# Patient Record
Sex: MALE | Race: WHITE | NOT HISPANIC OR LATINO | Employment: OTHER | URBAN - METROPOLITAN AREA
[De-identification: names, ages, dates, MRNs, and addresses within clinical notes are randomized per-mention and may not be internally consistent; named-entity substitution may affect disease eponyms.]

---

## 2024-08-02 ENCOUNTER — OFFICE VISIT (OUTPATIENT)
Dept: URGENT CARE | Facility: CLINIC | Age: 44
End: 2024-08-02

## 2024-08-02 ENCOUNTER — HOSPITAL ENCOUNTER (OUTPATIENT)
Dept: RADIOLOGY | Facility: CLINIC | Age: 44
Discharge: HOME | End: 2024-08-02

## 2024-08-02 VITALS
DIASTOLIC BLOOD PRESSURE: 92 MMHG | SYSTOLIC BLOOD PRESSURE: 146 MMHG | OXYGEN SATURATION: 99 % | RESPIRATION RATE: 14 BRPM | TEMPERATURE: 98.4 F | HEART RATE: 72 BPM

## 2024-08-02 DIAGNOSIS — S93.401A INVERSION SPRAIN OF ANKLE, RIGHT, INITIAL ENCOUNTER: ICD-10-CM

## 2024-08-02 DIAGNOSIS — G89.29 CHRONIC PAIN OF RIGHT ANKLE: ICD-10-CM

## 2024-08-02 DIAGNOSIS — M25.571 CHRONIC PAIN OF RIGHT ANKLE: ICD-10-CM

## 2024-08-02 DIAGNOSIS — G89.29 CHRONIC PAIN OF RIGHT ANKLE: Primary | ICD-10-CM

## 2024-08-02 DIAGNOSIS — M25.571 CHRONIC PAIN OF RIGHT ANKLE: Primary | ICD-10-CM

## 2024-08-02 PROCEDURE — 73610 X-RAY EXAM OF ANKLE: CPT | Mod: RIGHT SIDE | Performed by: RADIOLOGY

## 2024-08-02 PROCEDURE — 99212 OFFICE O/P EST SF 10 MIN: CPT | Performed by: PHYSICIAN ASSISTANT

## 2024-08-02 PROCEDURE — 73610 X-RAY EXAM OF ANKLE: CPT | Mod: RT

## 2024-08-02 RX ORDER — PREDNISONE 50 MG/1
50 TABLET ORAL DAILY
Qty: 10 TABLET | Refills: 0 | Status: SHIPPED | OUTPATIENT
Start: 2024-08-02 | End: 2024-08-12

## 2024-08-02 NOTE — PROGRESS NOTES
"[facility] URGENT CARE   MIKHAIL NOTE:      Name: Renny You, 44 y.o.    CSN:[id number]   MRN:[id number]    PCP: No Assigned PCP Generic Provider, MD    ALL:  No Known Allergies    History:    Chief Complaint: R ankle pain (X 2 months, no known injury)    Encounter Date: 8/2/2024  ***    HPI: The history was obtained from the {[facility] HISTORIAN:48268::\"patient\"}. Renny is a 44 y.o. male, who presents with a chief complaint of R ankle pain (X 2 months, no known injury) ***    PMHx:    History reviewed. No pertinent past medical history.           No current outpatient medications on file.     No current facility-administered medications for this visit.         PMSx:  History reviewed. No pertinent surgical history.    Fam Hx: No family history on file.    SOC. Hx:     Social History     Socioeconomic History   • Marital status: Single     Spouse name: Not on file   • Number of children: Not on file   • Years of education: Not on file   • Highest education level: Not on file   Occupational History   • Not on file   Tobacco Use   • Smoking status: Never   • Smokeless tobacco: Never   Substance and Sexual Activity   • Alcohol use: Not on file   • Drug use: Not on file   • Sexual activity: Not on file   Other Topics Concern   • Not on file   Social History Narrative   • Not on file     Social Determinants of Health     Financial Resource Strain: Not on file   Food Insecurity: Not on file   Transportation Needs: Not on file   Physical Activity: Not on file   Stress: Not on file   Social Connections: Not on file   Intimate Partner Violence: Not on file   Housing Stability: Not on file         Vitals:    08/02/24 1430   BP: (!) 146/92   Pulse: 72   Resp: 14   Temp: 36.9 °C (98.4 °F)   SpO2: 99%                Physical Exam  ***    LABORATORY @ RADIOLOGICAL IMAGING (if done):     No results found for this or any previous visit (from the past 24 " hour(s)).    ____________________________________________________________________    I did personally review Renny's past medical history, surgical history, social history, as well as family history (when relevant).  In this case, I also oversaw the his drug management by reviewing his medication list, allergy list, as well as the medications that I prescribed during the UC course and/or recommended as an out-patient (including possible OTC medications such as acetaminophen, NSAIDs , etc).    After reviewing the items above, I {DID/DID NOT:42275} look at previous medical documentation, such as recent hospitalizations, office visits, and/or recent consultations with PCP/specialist.                          SDOH:   Another factor that I considered in Renny's care was his Social Determinants of Health (SDOH). During this UC encounter, he {DID/DID NOT:53423} have social determinants of health. Those SDOH influencing Renny's care are: {pkvsdoh:80021}      _____________________________________________________________________      UC COURSE/MEDICAL DECISION MAKING:    Renny is a 44 y.o., who presents with a working diagnosis of No diagnosis found. with a differential to include:         Rupert Stout PA-C   Advanced Practice Provider  [facility] URGENT CARE

## 2024-08-02 NOTE — PROGRESS NOTES
[facility] URGENT CARE MIKHAIL NOTE:      Name: Renny You, 44 y.o.    CSN:[id number]   MRN:[id number]    PCP: No Assigned PCP Generic Provider, MD    ALL:  No Known Allergies    History:    Chief Complaint: R ankle pain (X 2 months, no known injury)    Encounter Date: 8/2/2024  14:41    HPI: The history was obtained from the patient. Renny is a 44 y.o. male, who presents with a chief complaint of R ankle pain (X 2 months, no known injury) Patient felt a throbbing pain in his right ankle last night. He states that the pain radiates from around his little toe towards his lower calf. The pain is elicited when he pivots his right foot to the left and during dorsiflexion. He notes that he may have initially had the injury about a month and a half ago when jumping out of his truck. Patient is a  and spends a lot of time driving. He has tried wearing a compression sock since last night and cold compresses with minimal relief. Patient is able to bear weight on his right foot.     PMHx:    History reviewed. No pertinent past medical history.           Current Outpatient Medications   Medication Sig Dispense Refill    predniSONE (Deltasone) 50 mg tablet Take 1 tablet (50 mg) by mouth once daily for 10 days. 10 tablet 0     No current facility-administered medications for this visit.         PMSx:  History reviewed. No pertinent surgical history.    Fam Hx: No family history on file.    SOC. Hx:     Social History     Socioeconomic History    Marital status: Single     Spouse name: Not on file    Number of children: Not on file    Years of education: Not on file    Highest education level: Not on file   Occupational History    Not on file   Tobacco Use    Smoking status: Never    Smokeless tobacco: Never   Substance and Sexual Activity    Alcohol use: Not on file    Drug use: Not on file    Sexual activity: Not on file   Other Topics Concern    Not on file   Social History Narrative    Not  on file     Social Determinants of Health     Financial Resource Strain: Not on file   Food Insecurity: Not on file   Transportation Needs: Not on file   Physical Activity: Not on file   Stress: Not on file   Social Connections: Not on file   Intimate Partner Violence: Not on file   Housing Stability: Not on file         Vitals:    08/02/24 1430   BP: (!) 146/92   Pulse: 72   Resp: 14   Temp: 36.9 °C (98.4 °F)   SpO2: 99%                Physical Exam  Vitals reviewed.   Constitutional:       Appearance: Normal appearance.   HENT:      Head: Normocephalic and atraumatic.      Mouth/Throat:      Mouth: Mucous membranes are moist.      Pharynx: Oropharynx is clear.   Cardiovascular:      Rate and Rhythm: Normal rate and regular rhythm.   Pulmonary:      Effort: Pulmonary effort is normal.      Breath sounds: Normal breath sounds.   Musculoskeletal:         General: Normal range of motion.      Cervical back: Normal range of motion.        Feet:    Feet:      Comments: Tenderness elicited with dorsiflexion and eversion of right foot. No ecchymoses, erythema, or edema.  Skin:     General: Skin is warm.   Neurological:      General: No focal deficit present.      Mental Status: He is alert and oriented to person, place, and time.   Psychiatric:         Mood and Affect: Mood normal.           LABORATORY @ RADIOLOGICAL IMAGING (if done):     Three views of right ankle radiographic imaging was done. No evidence of fracture or osteophytes present.  ____________________________________________________________________    I did personally review Renny's past medical history, surgical history, social history, as well as family history (when relevant).  In this case, I also oversaw the his drug management by reviewing his medication list, allergy list, as well as the medications that I prescribed during the UC course and/or recommended as an out-patient (including possible OTC medications such as acetaminophen, NSAIDs ,  etc).    After reviewing the items above, I did look at previous medical documentation, such as recent hospitalizations, office visits, and/or recent consultations with PCP/specialist.                          SDOH:   Another factor that I considered in Renny's care was his Social Determinants of Health (SDOH). During this  encounter, he did not have social determinants of health. Those SDOH influencing Renny's care are: none       COURSE/MEDICAL DECISION MAKING:    Renny is a 44 y.o., who presents with a working diagnosis of   1. Chronic pain of right ankle    2. Inversion sprain of ankle, right, initial encounter     with a differential to include:     Lateral ankle sprain  Fracture  DVT    Patient is still able to bear weight on right ankle. Patient has had ankle pain for about two months without resolution. Patient works as a  and drives for long periods of time, which may put him at risk for emboli traveling to his legs and be a cause of his symptoms.       Plan: Through shared decision making, discussed options with patient, including seeking physical therapy, ankle splinting, and obtaining radiographic imaging. Discussed that patient may be at risk for blood clots due to his work and may consider prophylactic aspirin in the future.     Reviewed ankle x-rays with patient and reassured patient that no fractures were found on imaging. Fitted patient with figure of ankle splint and educated him on proper usage.     Provided patient with compression stockings to wear while he is driving. Patient to start taking 10-day course of prednisone to reduce inflammation and counseled patient on ankle stretching exercises.     IAmber PA student, helped prepare the medical record for my supervising clinician, Rupert Stout PA-C.   STEFANO Power, [facility]    Supervised by  Rupert Stout PA-C   Advanced Practice Provider  [facility]  URGENT CARE    I was present with the PA student who participated in the documentation of this note. I have personally seen and re-examined the patient and performed the medical decision-making components (assessment and plan of care). I have reviewed the PA student documentation and verified the findings in the note as written with additions or exceptions as stated in the body of this note.    Rupert Stout PA-C

## 2025-07-12 ENCOUNTER — OFFICE VISIT (OUTPATIENT)
Dept: URGENT CARE | Facility: CLINIC | Age: 45
End: 2025-07-12

## 2025-07-12 VITALS
SYSTOLIC BLOOD PRESSURE: 147 MMHG | TEMPERATURE: 98 F | HEIGHT: 73 IN | WEIGHT: 210 LBS | RESPIRATION RATE: 16 BRPM | DIASTOLIC BLOOD PRESSURE: 93 MMHG | HEART RATE: 79 BPM | OXYGEN SATURATION: 97 % | BODY MASS INDEX: 27.83 KG/M2

## 2025-07-12 DIAGNOSIS — R63.1 POLYDIPSIA: Primary | ICD-10-CM

## 2025-07-12 DIAGNOSIS — R35.89 POLYURIA: ICD-10-CM

## 2025-07-12 DIAGNOSIS — R13.13 PHARYNGEAL DYSPHAGIA: ICD-10-CM

## 2025-07-12 DIAGNOSIS — R03.0 ELEVATED BLOOD PRESSURE READING: ICD-10-CM

## 2025-07-12 DIAGNOSIS — R81 GLUCOSURIA: ICD-10-CM

## 2025-07-12 LAB
POC APPEARANCE, URINE: CLEAR
POC BILIRUBIN, URINE: NEGATIVE
POC BLOOD, URINE: NEGATIVE
POC COLOR, URINE: YELLOW
POC GLUCOSE, URINE: ABNORMAL MG/DL
POC KETONES, URINE: NEGATIVE MG/DL
POC LEUKOCYTES, URINE: NEGATIVE
POC NITRITE,URINE: NEGATIVE
POC PH, URINE: 6 PH
POC PROTEIN, URINE: NEGATIVE MG/DL
POC SPECIFIC GRAVITY, URINE: 1.02
POC UROBILINOGEN, URINE: 0.2 EU/DL

## 2025-07-12 PROCEDURE — 81002 URINALYSIS NONAUTO W/O SCOPE: CPT | Performed by: PHYSICIAN ASSISTANT

## 2025-07-12 PROCEDURE — 99213 OFFICE O/P EST LOW 20 MIN: CPT | Performed by: PHYSICIAN ASSISTANT

## 2025-07-12 ASSESSMENT — VISUAL ACUITY: OU: 1

## 2025-07-12 NOTE — PATIENT INSTRUCTIONS
Suggest follow up with:  MD Tricia Henson Continuous Glucose monitor = [email or web address]  Tereso Continuous glucose monitor = [email or web address]    AI to help inform you of labs results & treatment suggestions = Isi Salazar Ada, Healthily

## 2025-07-12 NOTE — PROGRESS NOTES
[facility] URGENT CARE   MIKHAIL NOTE:      Name: Renny You, 45 y.o.    CSN:[id number]   MRN:[id number]    PCP: No Assigned PCP Generic Provider, MD    ALL:  Allergies[1]    History:    Chief Complaint: Dizziness (Pt states vertigo issues for the last few years as well as trouble swallowing, pt also states feet and leg pain for the last year with no injury.)    Encounter Date: 7/12/2025  10:23    At the beginning of the encounter, I introduced myself to the patient as a Physician Assistant in the urgent care setting, ensuring they understood my role in their care and establishing rapport.      HPI: The history was obtained from the patient. Renny is a 45 y.o. male, who presents with a chief complaint of Dizziness (Pt states vertigo issues for the last few years as well as trouble swallowing, pt also states feet and leg pain for the last year with no injury.)     Any of the symptoms have been chronic, more recent he has been experiencing progressive a sending pain in both legs noticed at night and at rest, he finds that this is also an experience of fatigue in both legs like he walked miles even though he was sitting for a while.    He is also experienced some occasional blurred vision, an increase in thirst and urination, he attributes this polydipsia and polyuria to increase hydration and water intake.  He says while at home he drinks at least 6, 500 mL bottles of water and while on the road he drinks at least 4.    He says for many years he had been drinking quite a bit and smoking quite a bit, he ultimately discontinued drinking smoking over a year and a half ago.  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    He does not have a family physician and plans to establish with 1, he is limited by lack of insurance  coverage.    .  Denies any chest pain, denies any dyspnea, denies any massive weight loss but has tried to lose weight and has maybe lost 15 pounds in the course of 6 months.            PMHx:    Denies taking anything        Current Medications[2]      PMSx:  Surgical History[3]    Fam Hx:   Grandfather  at 60s from coronary disease  Grandmother and mother all have a history of vertigo    SOC. Hx:     Social History     Socioeconomic History    Marital status: Single     Spouse name: Not on file    Number of children: Not on file    Years of education: Not on file    Highest education level: Not on file   Occupational History    Not on file   Tobacco Use    Smoking status: Former     Types: Cigarettes    Smokeless tobacco: Never   Substance and Sexual Activity    Alcohol use: Not Currently     Comment: Quit 1-1/2 years ago, had a heavy drinking history about 6-8 beers per night    Drug use: Yes     Types: Marijuana    Sexual activity: Not Currently   Other Topics Concern    Not on file   Social History Narrative    Not on file     Social Drivers of Health     Financial Resource Strain: Not on file   Food Insecurity: Not on file   Transportation Needs: Not on file   Physical Activity: Not on file   Stress: Not on file   Social Connections: Not on file   Intimate Partner Violence: Not on file   Housing Stability: Not on file         Vitals:    25 1019   BP: (!) 147/93   Pulse: 79   Resp: 16   Temp: 36.7 °C (98 °F)   SpO2: 97%     95.3 kg (210 lb)          Physical Exam  Vitals reviewed.   Constitutional:       Appearance: Normal appearance. He is obese.   HENT:      Head: Normocephalic and atraumatic.      Nose: Nose normal.      Mouth/Throat:      Mouth: Mucous membranes are moist.   Eyes:      General: Lids are normal. Vision grossly intact. No visual field deficit.     Extraocular Movements: Extraocular movements intact.      Conjunctiva/sclera:      Right eye: No exudate.     Left eye: No exudate.      Pupils: Pupils are equal, round, and reactive to light.      Right eye: Pupil is round and reactive.      Left eye: Pupil is round and reactive.      Funduscopic exam:     Right eye: No hemorrhage, exudate or papilledema. Red reflex present.         Left eye: No hemorrhage, exudate or papilledema. Red reflex present.     Slit lamp exam:     Right eye: Anterior chamber quiet.      Left eye: Anterior chamber quiet.      Comments: Pupils are 4 mm equal and reactive to light   Neck:      Thyroid: No thyromegaly or thyroid tenderness.   Cardiovascular:      Rate and Rhythm: Normal rate and regular rhythm.      Pulses: No decreased pulses.      Heart sounds: Normal heart sounds. No murmur heard.  Pulmonary:      Effort: Pulmonary effort is normal.      Breath sounds: Normal breath sounds. No decreased breath sounds, wheezing or rhonchi.   Abdominal:      General: Abdomen is flat.      Palpations: Abdomen is soft.   Musculoskeletal:         General: Normal range of motion.      Cervical back: Normal, full passive range of motion without pain, normal range of motion and neck supple.      Thoracic back: Normal.      Lumbar back: Normal.      Right lower leg: No edema.      Left lower leg: No edema.      Comments: Strength appears to be 5+ although upper and lower extremities   Lymphadenopathy:      Cervical: No cervical adenopathy.   Skin:     General: Skin is warm.      Capillary Refill: Capillary refill takes less than 2 seconds.   Neurological:      Mental Status: He is alert and oriented to person, place, and time.      GCS: GCS eye subscore is 4. GCS verbal subscore is 5. GCS motor subscore is 6.      Cranial Nerves: No facial asymmetry.      Sensory: Sensation is intact. No sensory deficit.      Motor: Motor function is intact. No weakness or tremor.      Coordination: Coordination is intact. Coordination normal. Finger-Nose-Finger Test normal.      Gait: Gait and tandem walk normal.      Deep Tendon Reflexes:       Reflex Scores:       Tricep reflexes are 1+ on the right side and 1+ on the left side.       Bicep reflexes are 1+ on the right side and 1+ on the left side.       Brachioradialis reflexes are 1+ on the right side and 1+ on the left side.       Patellar reflexes are 1+ on the right side and 1+ on the left side.       Achilles reflexes are 1+ on the right side and 1+ on the left side.  Psychiatric:         Behavior: Behavior normal.           LABORATORY @ RADIOLOGICAL IMAGING (if done):     Results for orders placed or performed in visit on 07/12/25 (from the past 24 hours)   POCT UA (nonautomated w/o microscopy) manually resulted   Result Value Ref Range    POC Color, Urine Yellow Straw, Yellow, Light-Yellow    POC Appearance, Urine Clear Clear    POC Glucose, Urine 100 (1+) (A) NEGATIVE mg/dl    POC Bilirubin, Urine NEGATIVE NEGATIVE    POC Ketones, Urine NEGATIVE NEGATIVE mg/dl    POC Specific Gravity, Urine 1.020 1.005 - 1.035    POC Blood, Urine NEGATIVE NEGATIVE    POC PH, Urine 6.0 No Reference Range Established PH    POC Protein, Urine NEGATIVE NEGATIVE mg/dl    POC Urobilinogen, Urine 0.2 0.2, 1.0 EU/DL    Poc Nitrite, Urine NEGATIVE NEGATIVE    POC Leukocytes, Urine NEGATIVE NEGATIVE       ____________________________________________________________________    I did personally review Renny's past medical history, surgical history, social history, as well as family history (when relevant).  In this case, I also oversaw the his drug management by reviewing his medication list, allergy list, as well as the medications that I prescribed during the UC course and/or recommended as an out-patient (including possible OTC medications such as acetaminophen, NSAIDs , etc).    After reviewing the items above, I did look at previous medical documentation, such as recent hospitalizations, office visits, and/or recent consultations with PCP/specialist.                          SDOH:   Another factor that I considered in  Renny's care was his Social Determinants of Health (SDOH). During this  encounter, he did not have social determinants of health. Those SDOH influencing Renny's care are: none      _____________________________________________________________________       COURSE/MEDICAL DECISION MAKING:    Renny is a 45 y.o., who presents with a working diagnosis of   1. Polydipsia    2. Polyuria    3. Pharyngeal dysphagia    4. Glucosuria    5. Elevated blood pressure reading      1.  Patient has evidence of glucosuria this is performed and observed on the point-of-care testing obtained in office today, I am concerned that this could be a clue to an underlying insulin resistance or diabetes and therefore we pursued testing along with the physical exam findings and HPI.     - We had a long discussion regarding overall testing and monitoring and will notify of the results of the test have been obtained here, he intends to purchase a third-party CGM, we discussed diet and low glycemic index foods being favorable, encouraged exercise at least in a aerobic form, discussed scheduling a follow-up appointment with Dr. Gupta, as he performs EGDs as well given his pharyngeal dysphagia with most solids would be beneficial to rule out a Schatzki's ring or some stricture.     - I am assuming most of these complaints will be resolved once his blood sugar is under control, I may prescribe metformin to bridge him until his appointment is scheduled, we discussed when to seek reevaluation or higher level of care, he was agreeable this plan seem to be motivated and favorable of treatment and was discharged.  - I have ordered labs to address the concern for diabetes (T2DM suspected) and to address any underlying renal pathology with UA tests.        Rupert Stout PA-C   Advanced Practice Provider  [facility] URGENT CARE    Please note: While the patient may or may not have received printed discharge paperwork,  all relevant medical findings, test results, and treatment details are accessible through the electronic medical record system. The patient is encouraged to review their chart via the patient portal for comprehensive information and follow-up instructions.         [1] No Known Allergies  [2]   No current outpatient medications on file.     No current facility-administered medications for this visit.   [3] No past surgical history on file.

## 2025-07-13 ENCOUNTER — TELEPHONE (OUTPATIENT)
Dept: URGENT CARE | Facility: CLINIC | Age: 45
End: 2025-07-13

## 2025-07-13 DIAGNOSIS — G62.9 NEUROPATHY: ICD-10-CM

## 2025-07-13 DIAGNOSIS — E11.69 TYPE 2 DIABETES MELLITUS WITH OTHER SPECIFIED COMPLICATION, WITHOUT LONG-TERM CURRENT USE OF INSULIN: Primary | ICD-10-CM

## 2025-07-13 DIAGNOSIS — R13.13 PHARYNGEAL DYSPHAGIA: ICD-10-CM

## 2025-07-13 LAB
ALBUMIN/CREAT UR: NORMAL
C PEPTIDE SERPL-MCNC: 5.91 NG/ML (ref 0.8–3.85)
CREAT UR-MCNC: NORMAL MG/DL
MICROALBUMIN UR-MCNC: NORMAL

## 2025-07-13 RX ORDER — METFORMIN HYDROCHLORIDE 500 MG/1
500 TABLET ORAL
Qty: 60 TABLET | Refills: 0 | Status: SHIPPED | OUTPATIENT
Start: 2025-07-13 | End: 2026-07-13

## 2025-07-14 LAB
ALBUMIN SERPL-MCNC: 4.5 G/DL (ref 3.6–5.1)
ALBUMIN/CREAT UR: 3 MG/G CREAT
ALP SERPL-CCNC: 52 U/L (ref 36–130)
ALT SERPL-CCNC: 13 U/L (ref 9–46)
ANION GAP SERPL CALCULATED.4IONS-SCNC: 9 MMOL/L (CALC) (ref 7–17)
AST SERPL-CCNC: 18 U/L (ref 10–40)
BASOPHILS # BLD AUTO: 58 CELLS/UL (ref 0–200)
BASOPHILS NFR BLD AUTO: 1.1 %
BILIRUB SERPL-MCNC: 0.9 MG/DL (ref 0.2–1.2)
BUN SERPL-MCNC: 12 MG/DL (ref 7–25)
C PEPTIDE SERPL-MCNC: 5.91 NG/ML (ref 0.8–3.85)
CALCIUM SERPL-MCNC: 9.3 MG/DL (ref 8.6–10.3)
CHLORIDE SERPL-SCNC: 105 MMOL/L (ref 98–110)
CO2 SERPL-SCNC: 25 MMOL/L (ref 20–32)
CREAT SERPL-MCNC: 0.9 MG/DL (ref 0.6–1.29)
CREAT UR-MCNC: 165 MG/DL (ref 20–320)
EGFRCR SERPLBLD CKD-EPI 2021: 106 ML/MIN/1.73M2
EOSINOPHIL # BLD AUTO: 69 CELLS/UL (ref 15–500)
EOSINOPHIL NFR BLD AUTO: 1.3 %
ERYTHROCYTE [DISTWIDTH] IN BLOOD BY AUTOMATED COUNT: 13.1 % (ref 11–15)
EST. AVERAGE GLUCOSE BLD GHB EST-MCNC: 117 MG/DL
EST. AVERAGE GLUCOSE BLD GHB EST-SCNC: 6.5 MMOL/L
FERRITIN SERPL-MCNC: 96 NG/ML (ref 38–380)
GLUCOSE SERPL-MCNC: 123 MG/DL (ref 65–99)
HBA1C MFR BLD: 5.7 %
HCT VFR BLD AUTO: 48.5 % (ref 38.5–50)
HGB BLD-MCNC: 15.8 G/DL (ref 13.2–17.1)
IRON SATN MFR SERPL: 24 % (CALC) (ref 20–48)
IRON SERPL-MCNC: 86 MCG/DL (ref 50–180)
LYMPHOCYTES # BLD AUTO: 1701 CELLS/UL (ref 850–3900)
LYMPHOCYTES NFR BLD AUTO: 32.1 %
MCH RBC QN AUTO: 30.3 PG (ref 27–33)
MCHC RBC AUTO-ENTMCNC: 32.6 G/DL (ref 32–36)
MCV RBC AUTO: 93.1 FL (ref 80–100)
MICROALBUMIN UR-MCNC: 0.5 MG/DL
MONOCYTES # BLD AUTO: 413 CELLS/UL (ref 200–950)
MONOCYTES NFR BLD AUTO: 7.8 %
NEUTROPHILS # BLD AUTO: 3058 CELLS/UL (ref 1500–7800)
NEUTROPHILS NFR BLD AUTO: 57.7 %
PLATELET # BLD AUTO: 227 THOUSAND/UL (ref 140–400)
PMV BLD REES-ECKER: 10.3 FL (ref 7.5–12.5)
POTASSIUM SERPL-SCNC: 4.2 MMOL/L (ref 3.5–5.3)
PROT SERPL-MCNC: 7 G/DL (ref 6.1–8.1)
QUEST TRACKING HOUSE ACCOUNT: NORMAL
RBC # BLD AUTO: 5.21 MILLION/UL (ref 4.2–5.8)
SODIUM SERPL-SCNC: 139 MMOL/L (ref 135–146)
TIBC SERPL-MCNC: 365 MCG/DL (CALC) (ref 250–425)
TSH SERPL-ACNC: 0.77 MIU/L (ref 0.4–4.5)
VIT B12 SERPL-MCNC: NORMAL PG/ML
WBC # BLD AUTO: 5.3 THOUSAND/UL (ref 3.8–10.8)

## 2025-07-16 NOTE — TELEPHONE ENCOUNTER
[facility] URGENT CARE Telephone NOTE:    Name: Renny You, 45 y.o.    CSN:[id number]   MRN:[id number]    PCP: No Assigned PCP Generic Provider, MD  ALL:  Allergies[1]      Date of call: 07/16/25  Time of Call: 17:38hrs    Connection was: attempted, his voicemail was cut off        Purpose:  Review labs    Details:   Laboratory results indicate evidence of insulin resistance. I believe that initiating medical management and beginning routine blood glucose monitoring would be highly beneficial--not only for improving symptom control but also for helping the patient better understand his body's glycemic response, including patterns of spikes and troughs.    We had a detailed and informative discussion during the visit, and I strongly encourage him to establish care with a primary care provider, which will be important for managing his overall health and chronic conditions. Additionally, he would still benefit from a gastroenterology consultation with Dr. Gupta to further evaluate ongoing GI concerns.           [1] No Known Allergies

## 2025-08-08 ENCOUNTER — RESULTS FOLLOW-UP (OUTPATIENT)
Dept: URGENT CARE | Facility: CLINIC | Age: 45
End: 2025-08-08

## 2025-08-08 LAB
FERRITIN SERPL-MCNC: 110 NG/ML (ref 38–380)
FOLATE SERPL-MCNC: 20.4 NG/ML
IRON SERPL-MCNC: 71 MCG/DL (ref 50–180)
VIT B12 SERPL-MCNC: 724 PG/ML (ref 200–1100)

## 2025-08-08 NOTE — TELEPHONE ENCOUNTER
[facility] URGENT CARE Telephone NOTE:    Name: Renny You, 45 y.o.    CSN:[id number]   MRN:[id number]    PCP: No Assigned PCP Generic Provider, MD  ALL:  Allergies[1]      Date of call: 08/08/25  Time of Call: 10:18 AM    Connection was: not made    Spoke with: Patient      Purpose:  I left a message with patient to return phone call.  Has reviewed lab results via [employer].  Patient advised to call into urgent care with any questions or concerns about laboratory testing.                [1] No Known Allergies

## 2025-08-13 ENCOUNTER — OFFICE VISIT (OUTPATIENT)
Dept: PRIMARY CARE | Facility: CLINIC | Age: 45
End: 2025-08-13

## 2025-08-13 VITALS
HEART RATE: 74 BPM | DIASTOLIC BLOOD PRESSURE: 78 MMHG | HEIGHT: 73 IN | SYSTOLIC BLOOD PRESSURE: 129 MMHG | WEIGHT: 183 LBS | BODY MASS INDEX: 24.25 KG/M2

## 2025-08-13 DIAGNOSIS — R06.2 WHEEZING: ICD-10-CM

## 2025-08-13 DIAGNOSIS — Z13.220 LIPID SCREENING: ICD-10-CM

## 2025-08-13 DIAGNOSIS — R73.03 PREDIABETES: Primary | ICD-10-CM

## 2025-08-13 DIAGNOSIS — R07.89 CHEST WALL PAIN: ICD-10-CM

## 2025-08-13 DIAGNOSIS — N52.9 ERECTILE DYSFUNCTION, UNSPECIFIED ERECTILE DYSFUNCTION TYPE: ICD-10-CM

## 2025-08-13 PROBLEM — R06.09 DYSPNEA ON EXERTION: Status: ACTIVE | Noted: 2025-08-13

## 2025-08-13 PROCEDURE — 3008F BODY MASS INDEX DOCD: CPT | Performed by: INTERNAL MEDICINE

## 2025-08-13 PROCEDURE — 99203 OFFICE O/P NEW LOW 30 MIN: CPT | Performed by: INTERNAL MEDICINE

## 2025-08-13 RX ORDER — INSULIN PUMP SYRINGE, 3 ML
1 EACH MISCELLANEOUS AS NEEDED
COMMUNITY

## 2025-08-13 RX ORDER — SILDENAFIL 50 MG/1
50 TABLET, FILM COATED ORAL DAILY PRN
Qty: 12 TABLET | Refills: 3 | Status: SHIPPED | OUTPATIENT
Start: 2025-08-13 | End: 2026-08-13

## 2025-08-13 ASSESSMENT — ENCOUNTER SYMPTOMS
WHEEZING: 0
ADENOPATHY: 0
CONSTIPATION: 0
NECK STIFFNESS: 0
RESPIRATORY NEGATIVE: 1
ABDOMINAL DISTENTION: 0
EYE DISCHARGE: 0
SHORTNESS OF BREATH: 0
CONSTITUTIONAL NEGATIVE: 1
LIGHT-HEADEDNESS: 0
ALLERGIC/IMMUNOLOGIC NEGATIVE: 1
ABDOMINAL PAIN: 0
BACK PAIN: 0
CARDIOVASCULAR NEGATIVE: 1
CHILLS: 0
CONFUSION: 0
AGITATION: 0
MUSCULOSKELETAL NEGATIVE: 1
FEVER: 0
DIARRHEA: 0
EYES NEGATIVE: 1
ENDOCRINE NEGATIVE: 1
HEADACHES: 0
GASTROINTESTINAL NEGATIVE: 1
VOMITING: 0
JOINT SWELLING: 0
PALPITATIONS: 0
DYSURIA: 0
PSYCHIATRIC NEGATIVE: 1
NAUSEA: 0
HEMATOLOGIC/LYMPHATIC NEGATIVE: 1
NUMBNESS: 0
COUGH: 0
NEUROLOGICAL NEGATIVE: 1

## 2025-08-13 ASSESSMENT — PATIENT HEALTH QUESTIONNAIRE - PHQ9
2. FEELING DOWN, DEPRESSED OR HOPELESS: NOT AT ALL
SUM OF ALL RESPONSES TO PHQ9 QUESTIONS 1 AND 2: 0
1. LITTLE INTEREST OR PLEASURE IN DOING THINGS: NOT AT ALL

## 2025-09-15 ENCOUNTER — APPOINTMENT (OUTPATIENT)
Dept: PRIMARY CARE | Facility: CLINIC | Age: 45
End: 2025-09-15